# Patient Record
(demographics unavailable — no encounter records)

---

## 2024-11-06 NOTE — ADDENDUM
[FreeTextEntry1] : Documented by Vani Smallwood acting as a scribe for Dr. Marita Medrano. 11/06/2024   All medical record entries made by the scribe were at my, Dr. Marita Medrano, direction and personally dictated by me on 11/06/2024. I have reviewed the chart and agree that the record accurately reflects my personal performance of the history, physical exam, assessment and plan. I have also personally directed, reviewed, and agreed with the chart.

## 2024-11-06 NOTE — PHYSICAL EXAM
[No Acute Distress] : no acute distress [Well-Appearing] : well-appearing [Normal Sclera/Conjunctiva] : normal sclera/conjunctiva [Normal Outer Ear/Nose] : the outer ears and nose were normal in appearance [No JVD] : no jugular venous distention [No Lymphadenopathy] : no lymphadenopathy [Supple] : supple [No Respiratory Distress] : no respiratory distress  [No Accessory Muscle Use] : no accessory muscle use [Clear to Auscultation] : lungs were clear to auscultation bilaterally [Normal Rate] : normal rate  [Regular Rhythm] : with a regular rhythm [Normal S1, S2] : normal S1 and S2 [No Murmur] : no murmur heard [Pedal Pulses Present] : the pedal pulses are present [No Edema] : there was no peripheral edema [No Extremity Clubbing/Cyanosis] : no extremity clubbing/cyanosis [Soft] : abdomen soft [Non Tender] : non-tender [Non-distended] : non-distended [Normal Posterior Cervical Nodes] : no posterior cervical lymphadenopathy [Normal Anterior Cervical Nodes] : no anterior cervical lymphadenopathy [No CVA Tenderness] : no CVA  tenderness [No Spinal Tenderness] : no spinal tenderness [No Joint Swelling] : no joint swelling [Grossly Normal Strength/Tone] : grossly normal strength/tone [No Rash] : no rash [Coordination Grossly Intact] : coordination grossly intact [No Focal Deficits] : no focal deficits [Normal Gait] : normal gait [Normal Affect] : the affect was normal [Normal Insight/Judgement] : insight and judgment were intact

## 2024-11-06 NOTE — ASSESSMENT
[FreeTextEntry1] : Follow up  UTD with flu vaccine  HTN cont HCTZ 25 mg daily Reinforced the importance of following a low sodium diet/increased physical activity  New  diabetes  started on Metformin 500mg daily last visit reinforced importance of following a low sugar/low carb diet and increasing physical activity. we'll check A1c today  Low Vit D cont Vit D 2000iu daily  Blood work ordered. Follow up in one week for lab results

## 2024-11-06 NOTE — HISTORY OF PRESENT ILLNESS
[de-identified] : Ms. KAREN CHERRY is a 66 year old female with Hx of DM II, HTN, who presents for a follow up visit.  Pt states she is feeling well. Offers no complaints.  Denies any SOB, CP, abdominal pain, N/V/D, headache, dizziness, or leg swelling. She was started on Metformin 500mg last visit. Reports compliance with taking her meds daily.

## 2024-12-17 NOTE — HISTORY OF PRESENT ILLNESS
[FreeTextEntry1] : Stage IA G1 pMMR EMCA. RTLH, BSO, SLNMB on 3/14/24.  Returns for surveillance visit today. Feeling well. Denies bleeding, discharge, pain, or urinary concerns. Pt reports heartburn and occasional constipation. She advised that she is scheduling a GI appt for eval. She also saw GYN Dr. Ca for routine WW exam 3 weeks ago.

## 2024-12-17 NOTE — PHYSICAL EXAM
[Chaperone Present] : A chaperone was present in the examining room during all aspects of the physical examination [Absent] : Adnexa(ae): Absent [Normal] : Recto-Vaginal Exam: Normal [Fully active, able to carry on all pre-disease performance without restriction] : Status 0 - Fully active, able to carry on all pre-disease performance without restriction [40775] : A chaperone was present during the pelvic exam. [FreeTextEntry2] : Eladia [de-identified] : LSC scars

## 2025-05-11 NOTE — HISTORY OF PRESENT ILLNESS
[de-identified] :  Ms. KAREN CHERRY is a 67 year old female with hx of DM II, HTN, presenting for a follow up visit.  Pt c/o of having dizziness when she got up last Friday night that lasted a few hours. Pt also reports ringing in her ears. Pt denies this happening again.  Pt c/o constipation.  Denies any SOB, CP, abdominal pain, N/V/D, headache, dizziness, or leg swelling. Reports compliance with taking her meds daily.

## 2025-05-11 NOTE — HISTORY OF PRESENT ILLNESS
[de-identified] :  Ms. KAREN CHERRY is a 67 year old female with hx of DM II, HTN, presenting for a follow up visit.  Pt c/o of having dizziness when she got up last Friday night that lasted a few hours. Pt also reports ringing in her ears. Pt denies this happening again.  Pt c/o constipation.  Denies any SOB, CP, abdominal pain, N/V/D, headache, dizziness, or leg swelling. Reports compliance with taking her meds daily.

## 2025-05-11 NOTE — HISTORY OF PRESENT ILLNESS
[de-identified] :  Ms. KAREN CHERRY is a 67 year old female with hx of DM II, HTN, presenting for a follow up visit.  Pt c/o of having dizziness when she got up last Friday night that lasted a few hours. Pt also reports ringing in her ears. Pt denies this happening again.  Pt c/o constipation.  Denies any SOB, CP, abdominal pain, N/V/D, headache, dizziness, or leg swelling. Reports compliance with taking her meds daily.

## 2025-05-11 NOTE — ASSESSMENT
[FreeTextEntry1] : Follow up  dizziness EKG - sinus rhythm @ 74bpm we'll check CBC today  tinnitus of both ears referred to ENT  HTN cont HCTZ 25 mg daily Reinforced the importance of following a low sodium diet/increased physical activity  diabetes  cont Metformin 500mg daily last visit reinforced importance of following a low sugar/low carb diet and increasing physical activity. we'll check glucose and A1c today  Low Vit D cont Vit D 2000iu daily  Blood work ordered. Follow up in one week for lab results

## 2025-05-11 NOTE — ADDENDUM
[FreeTextEntry1] : Documented by Angy Sandhu acting as a scribe for Dr. Marita Medrano. 05/05/2025  All medical records entries made by the scribe were at my, Dr. Medrano, direction and personally dictated by me on 05/05/2025. I have reviewed the chart and agree that the record accurately reflects my personal performance of the history, physical exam, assessment and plan. I have also personally directed, reviewed, and agreed with the chart.

## 2025-06-24 NOTE — HISTORY OF PRESENT ILLNESS
[FreeTextEntry1] : Stage IA G1 pMMR EMCA. RTLH, BSO, SLNMB on 3/14/24.  Returns for surveillance visit today. Feeling well. Denies bleeding, discharge, pain, or urinary concerns. Pt reports heartburn and occasional constipation. She advised that she is scheduling a GI appt for eval.

## 2025-06-24 NOTE — PHYSICAL EXAM
[Absent] : Adnexa(ae): Absent [Normal] : Recto-Vaginal Exam: Normal [Fully active, able to carry on all pre-disease performance without restriction] : Status 0 - Fully active, able to carry on all pre-disease performance without restriction [MA] : MA [FreeTextEntry2] : Eladia [de-identified] : LSC scars